# Patient Record
Sex: MALE | Race: WHITE | NOT HISPANIC OR LATINO | URBAN - METROPOLITAN AREA
[De-identification: names, ages, dates, MRNs, and addresses within clinical notes are randomized per-mention and may not be internally consistent; named-entity substitution may affect disease eponyms.]

---

## 2023-01-05 ENCOUNTER — INPATIENT (INPATIENT)
Facility: HOSPITAL | Age: 58
LOS: 0 days | Discharge: LEFT BEFORE DISCHARGE | DRG: 147 | End: 2023-01-06
Attending: STUDENT IN AN ORGANIZED HEALTH CARE EDUCATION/TRAINING PROGRAM | Admitting: STUDENT IN AN ORGANIZED HEALTH CARE EDUCATION/TRAINING PROGRAM
Payer: MEDICAID

## 2023-01-05 VITALS
RESPIRATION RATE: 18 BRPM | HEART RATE: 127 BPM | OXYGEN SATURATION: 97 % | SYSTOLIC BLOOD PRESSURE: 120 MMHG | DIASTOLIC BLOOD PRESSURE: 82 MMHG | TEMPERATURE: 98 F

## 2023-01-05 LAB
ALBUMIN SERPL ELPH-MCNC: 4 G/DL — SIGNIFICANT CHANGE UP (ref 3.3–5.2)
ALP SERPL-CCNC: 105 U/L — SIGNIFICANT CHANGE UP (ref 40–120)
ALT FLD-CCNC: 8 U/L — SIGNIFICANT CHANGE UP
ANION GAP SERPL CALC-SCNC: 9 MMOL/L — SIGNIFICANT CHANGE UP (ref 5–17)
AST SERPL-CCNC: 18 U/L — SIGNIFICANT CHANGE UP
BASOPHILS # BLD AUTO: 0.03 K/UL — SIGNIFICANT CHANGE UP (ref 0–0.2)
BASOPHILS NFR BLD AUTO: 0.4 % — SIGNIFICANT CHANGE UP (ref 0–2)
BILIRUB SERPL-MCNC: 0.4 MG/DL — SIGNIFICANT CHANGE UP (ref 0.4–2)
BUN SERPL-MCNC: 14.9 MG/DL — SIGNIFICANT CHANGE UP (ref 8–20)
CALCIUM SERPL-MCNC: 10.2 MG/DL — SIGNIFICANT CHANGE UP (ref 8.4–10.5)
CHLORIDE SERPL-SCNC: 99 MMOL/L — SIGNIFICANT CHANGE UP (ref 96–108)
CO2 SERPL-SCNC: 31 MMOL/L — HIGH (ref 22–29)
CREAT SERPL-MCNC: 0.61 MG/DL — SIGNIFICANT CHANGE UP (ref 0.5–1.3)
EGFR: 111 ML/MIN/1.73M2 — SIGNIFICANT CHANGE UP
EOSINOPHIL # BLD AUTO: 0.07 K/UL — SIGNIFICANT CHANGE UP (ref 0–0.5)
EOSINOPHIL NFR BLD AUTO: 1 % — SIGNIFICANT CHANGE UP (ref 0–6)
FLUAV AG NPH QL: SIGNIFICANT CHANGE UP
FLUBV AG NPH QL: SIGNIFICANT CHANGE UP
GLUCOSE SERPL-MCNC: 94 MG/DL — SIGNIFICANT CHANGE UP (ref 70–99)
HCT VFR BLD CALC: 40 % — SIGNIFICANT CHANGE UP (ref 39–50)
HGB BLD-MCNC: 14 G/DL — SIGNIFICANT CHANGE UP (ref 13–17)
IMM GRANULOCYTES NFR BLD AUTO: 0.3 % — SIGNIFICANT CHANGE UP (ref 0–0.9)
LYMPHOCYTES # BLD AUTO: 1.11 K/UL — SIGNIFICANT CHANGE UP (ref 1–3.3)
LYMPHOCYTES # BLD AUTO: 15.3 % — SIGNIFICANT CHANGE UP (ref 13–44)
MAGNESIUM SERPL-MCNC: 2.1 MG/DL — SIGNIFICANT CHANGE UP (ref 1.6–2.6)
MCHC RBC-ENTMCNC: 30.2 PG — SIGNIFICANT CHANGE UP (ref 27–34)
MCHC RBC-ENTMCNC: 35 GM/DL — SIGNIFICANT CHANGE UP (ref 32–36)
MCV RBC AUTO: 86.2 FL — SIGNIFICANT CHANGE UP (ref 80–100)
MONOCYTES # BLD AUTO: 0.48 K/UL — SIGNIFICANT CHANGE UP (ref 0–0.9)
MONOCYTES NFR BLD AUTO: 6.6 % — SIGNIFICANT CHANGE UP (ref 2–14)
NEUTROPHILS # BLD AUTO: 5.56 K/UL — SIGNIFICANT CHANGE UP (ref 1.8–7.4)
NEUTROPHILS NFR BLD AUTO: 76.4 % — SIGNIFICANT CHANGE UP (ref 43–77)
PLATELET # BLD AUTO: 273 K/UL — SIGNIFICANT CHANGE UP (ref 150–400)
POTASSIUM SERPL-MCNC: 4.2 MMOL/L — SIGNIFICANT CHANGE UP (ref 3.5–5.3)
POTASSIUM SERPL-SCNC: 4.2 MMOL/L — SIGNIFICANT CHANGE UP (ref 3.5–5.3)
PROT SERPL-MCNC: 6.9 G/DL — SIGNIFICANT CHANGE UP (ref 6.6–8.7)
RBC # BLD: 4.64 M/UL — SIGNIFICANT CHANGE UP (ref 4.2–5.8)
RBC # FLD: 13 % — SIGNIFICANT CHANGE UP (ref 10.3–14.5)
RSV RNA NPH QL NAA+NON-PROBE: SIGNIFICANT CHANGE UP
SARS-COV-2 RNA SPEC QL NAA+PROBE: SIGNIFICANT CHANGE UP
SODIUM SERPL-SCNC: 139 MMOL/L — SIGNIFICANT CHANGE UP (ref 135–145)
WBC # BLD: 7.27 K/UL — SIGNIFICANT CHANGE UP (ref 3.8–10.5)
WBC # FLD AUTO: 7.27 K/UL — SIGNIFICANT CHANGE UP (ref 3.8–10.5)

## 2023-01-05 PROCEDURE — 99053 MED SERV 10PM-8AM 24 HR FAC: CPT

## 2023-01-05 PROCEDURE — 70491 CT SOFT TISSUE NECK W/DYE: CPT | Mod: 26,MA

## 2023-01-05 PROCEDURE — 99285 EMERGENCY DEPT VISIT HI MDM: CPT

## 2023-01-05 RX ORDER — SODIUM CHLORIDE 9 MG/ML
1000 INJECTION, SOLUTION INTRAVENOUS ONCE
Refills: 0 | Status: COMPLETED | OUTPATIENT
Start: 2023-01-05 | End: 2023-01-05

## 2023-01-05 RX ORDER — MORPHINE SULFATE 50 MG/1
4 CAPSULE, EXTENDED RELEASE ORAL ONCE
Refills: 0 | Status: DISCONTINUED | OUTPATIENT
Start: 2023-01-05 | End: 2023-01-05

## 2023-01-05 RX ORDER — ACETAMINOPHEN 500 MG
1000 TABLET ORAL ONCE
Refills: 0 | Status: COMPLETED | OUTPATIENT
Start: 2023-01-05 | End: 2023-01-05

## 2023-01-05 RX ADMIN — MORPHINE SULFATE 4 MILLIGRAM(S): 50 CAPSULE, EXTENDED RELEASE ORAL at 20:30

## 2023-01-05 RX ADMIN — SODIUM CHLORIDE 1000 MILLILITER(S): 9 INJECTION, SOLUTION INTRAVENOUS at 21:05

## 2023-01-05 RX ADMIN — Medication 1000 MILLIGRAM(S): at 21:36

## 2023-01-05 RX ADMIN — MORPHINE SULFATE 4 MILLIGRAM(S): 50 CAPSULE, EXTENDED RELEASE ORAL at 20:05

## 2023-01-05 RX ADMIN — SODIUM CHLORIDE 1000 MILLILITER(S): 9 INJECTION, SOLUTION INTRAVENOUS at 20:05

## 2023-01-05 RX ADMIN — Medication 1000 MILLIGRAM(S): at 21:10

## 2023-01-05 RX ADMIN — Medication 400 MILLIGRAM(S): at 20:52

## 2023-01-05 NOTE — ED ADULT NURSE NOTE - OBJECTIVE STATEMENT
57 year old male presents to ED with c/o painful mass under the tongue.  A&Ox4  CM->NSR  O2 sat 98% on RA  Patient with past medical history of throat cancer s/p chemo/radiation/surgery.  Patient reports he has had a mass growing under the tongue for about 6 weeks, now is unable to swallow solids or liquids, having difficulty speaking and pain has been getting worse.  Seen and evaluated by provider, orders obtained and noted.  IV placed.  Blood specimens and nasal swab obtained and sent to lab.  IVF bolus infusing as ordered.  Patient medicated as ordered.  Patient resting comfortably on stretcher in no acute distress.  Offers no further complaints at this time.

## 2023-01-05 NOTE — ED PROVIDER NOTE - ENMT, MLM
Airway patent, Nasal mucosa clear. Mass protruding from the floor of the mouth under the tongue, voice muffled. Tolerating secretions.

## 2023-01-05 NOTE — ED PROVIDER NOTE - CLINICAL SUMMARY MEDICAL DECISION MAKING FREE TEXT BOX
Likely malignancy to the floor of the mouth, unable to take PO, appears malnourished. Requiring Iv analgesics. ENT consulted for evaluation of treatment options. Will require admission for medical optimization, speech/swallow eval, possible PEG.

## 2023-01-05 NOTE — ED ADULT TRIAGE NOTE - CHIEF COMPLAINT QUOTE
" has a lump growing under tongue and throat which also feels swollen" x 6 weeks. pt reports difficulty speaking/eating /swallowing for 6 weeks. denies sob/drooling/cp. hx esophageal CA > 10 yrs.

## 2023-01-05 NOTE — ED PROVIDER NOTE - OBJECTIVE STATEMENT
57yom w/ CAD, hx of throat CA s/p chemo/radiation/surgery p/w a painful mass under the tongue. Reports he has had a mass growing under the tongue for about 6 weeks, now unable to swallow solids or liquids, having difficulty speaking and pain has been getting worse. Reports pain in the anterior neck as well.

## 2023-01-05 NOTE — ED STATDOCS - PROGRESS NOTE DETAILS
Marnie Romero for ED attending, Dr. Beard: 58 y/o male with PMHx of esophageal cancer with growing mass under tongue, hoarse voice tachycardic, headache,  PO intake, known CAD, Pt will be placed in the main ED for further monitoring for complete evaluation by another provider.

## 2023-01-06 VITALS
OXYGEN SATURATION: 100 % | SYSTOLIC BLOOD PRESSURE: 113 MMHG | DIASTOLIC BLOOD PRESSURE: 67 MMHG | RESPIRATION RATE: 20 BRPM | HEART RATE: 77 BPM

## 2023-01-06 DIAGNOSIS — D49.0 NEOPLASM OF UNSPECIFIED BEHAVIOR OF DIGESTIVE SYSTEM: ICD-10-CM

## 2023-01-06 PROCEDURE — 80053 COMPREHEN METABOLIC PANEL: CPT

## 2023-01-06 PROCEDURE — 87637 SARSCOV2&INF A&B&RSV AMP PRB: CPT

## 2023-01-06 PROCEDURE — 12345: CPT | Mod: NC

## 2023-01-06 PROCEDURE — 36415 COLL VENOUS BLD VENIPUNCTURE: CPT

## 2023-01-06 PROCEDURE — 96375 TX/PRO/DX INJ NEW DRUG ADDON: CPT

## 2023-01-06 PROCEDURE — 70491 CT SOFT TISSUE NECK W/DYE: CPT | Mod: MA

## 2023-01-06 PROCEDURE — 96361 HYDRATE IV INFUSION ADD-ON: CPT

## 2023-01-06 PROCEDURE — 96365 THER/PROPH/DIAG IV INF INIT: CPT

## 2023-01-06 PROCEDURE — 99285 EMERGENCY DEPT VISIT HI MDM: CPT | Mod: 25

## 2023-01-06 PROCEDURE — 85025 COMPLETE CBC W/AUTO DIFF WBC: CPT

## 2023-01-06 PROCEDURE — 83735 ASSAY OF MAGNESIUM: CPT

## 2023-01-06 RX ORDER — MORPHINE SULFATE 50 MG/1
4 CAPSULE, EXTENDED RELEASE ORAL ONCE
Refills: 0 | Status: DISCONTINUED | OUTPATIENT
Start: 2023-01-06 | End: 2023-01-06

## 2023-01-06 RX ADMIN — MORPHINE SULFATE 4 MILLIGRAM(S): 50 CAPSULE, EXTENDED RELEASE ORAL at 03:47

## 2023-01-06 NOTE — CHART NOTE - NSCHARTNOTEFT_GEN_A_CORE
Nocturnist note    Received call from ED for admission to medicine service for further evaluation of large mandibular mass and dysphagia.  Pt was seen and examined and during discussion of care plan which included strict NPO, GI and oncology evaluation, pt stated he wanted to leave AMA as he does not want to be NPO and does not want any feeding tube.  Pt states he wants liquid.  Risk of AMA discussed including delayed evaluation and treatment of likely reoccurrence of throat cancer, aspiration risk with complication of PNA and death, etc.  Pt verbalized understanding of diagnostic findings, risk of AMA and still wants to leave.   AMA form filled out with ED RN Ashlyn Perea at bedside.  Form provided to ED  to scan and place in chart.       Of note, below is information gathered from pt during interview.     57yoM hx former smoker, CAD s/p remote PCI (~2004), throat CA s/p RT/chemo with prior trach/PEG (~2009, performed at [facility]), now reversed, presenting with several week history of painful mass in jaw of month associated with worsening dysphagia and unintentional weight loss. Pt oncologist is Dr. Trey Briggs and radiation oncologist is Dr. Bean and he hasn't followed with either doctor in the past 3-4 years.  Pt states he is not currently on any medication such as ASA or statin.   CT neck with IV contrast performed, shows locally invading soft tissue mass in mandible of malginant etiology, possibly recurrent SCCA given hx.    On exam, pt with visible, large ulcerative mass in bottom of mouth and is hypophonic due to mass.  Pt has pen and paper at bedside to help him communicate.      Pt notified of CT findings and likelihood of recurrent cancer.  Plan for strict NPO, IVF, oncology evaluation, and GI evaluation regarding PEG for nutrition and dysphagia discussed.  As above, pt states he does not want any feeding tube, wants to consume liquids orally.  Pt states he does not believe he is aspirating. Nocturnist note    Received call from ED for admission to medicine service for further evaluation of large mandibular mass and dysphagia.  Pt was seen and examined and during discussion of care plan which included strict NPO, GI and oncology evaluation, pt stated he wanted to leave AMA as he does not want to be NPO and does not want any feeding tube.  Pt states he wants liquid.  Risk of AMA discussed including delayed evaluation and treatment of likely reoccurrence of throat cancer, aspiration risk with complication of PNA and death, etc.  Pt verbalized understanding of diagnostic findings, risk of AMA and still wants to leave.   AMA form filled out with ED RN Ashlyn Perea at bedside who will remove IV from arm.  Form provided to ED  to scan and place in chart.   Pt states he will find his own transport home.      Below is information gathered from pt during interview.     57yoM hx former smoker, CAD s/p remote PCI (~2004), throat CA s/p RT/chemo with prior trach/PEG (~2009, performed at [facility]), now reversed, presenting with several week history of painful mass in jaw of month associated with worsening dysphagia and unintentional weight loss. Pt oncologist is Dr. Trey Briggs and radiation oncologist is Dr. Bean and he hasn't followed with either doctor in the past 3-4 years.  Pt states he is not currently on any medication such as ASA or statin.   CT neck with IV contrast performed, shows locally invading soft tissue mass in mandible of malginant etiology, possibly recurrent SCCA given hx.    On exam, pt with cachetic and has visible, large ulcerative mass in bottom of mouth. His speech is muffled and hypophonic due to jaw mass.  Pt has pen and paper at bedside to help him communicate.      Pt notified of CT findings and likelihood of recurrent head and neck cancer.  Plan for strict NPO, IVF, oncology, palliative and GI evaluation (re: likely need for PEG) all discussed with pat.  As above, pt states he does not want any feeding tube, wants to consume liquids orally.  Pt states he does not believe he is aspirating. Nocturnist note    Received call from ED for admission to medicine service for further evaluation of large mandibular mass and dysphagia.  Pt was seen and examined and during discussion of care plan which included strict NPO, GI and oncology evaluation, pt stated he wanted to leave AMA as he does not want to be NPO and does not want any feeding tube.  Pt states he wants liquid.  Risk of AMA discussed including delayed evaluation and treatment of likely reoccurrence of throat cancer, aspiration risk with complication of PNA and death, etc.  Pt verbalized understanding of diagnostic findings, risk of AMA and still wants to leave.   AMA form filled out with ED RN Ashlyn Perea at bedside who will remove IV from arm.  Form provided to ED  to scan and place in chart.   Pt states he will find his own transport home.      Below is information gathered from pt during interview.     57yoM hx former smoker, CAD s/p remote PCI (~2004), throat CA s/p RT/chemo with prior trach/PEG (~2009, performed at [facility]), now reversed, presenting with several week history of painful mass in jaw of month associated with worsening dysphagia and unintentional weight loss. Pt oncologist is Dr. Trey Briggs and radiation oncologist is Dr. Bean and he hasn't followed with either doctor in the past 3-4 years.  Pt states he is not currently on any medication (i.e. no maintenance chemo or cardiac meds such as ASA, statin).   CT neck with IV contrast performed, shows locally invading soft tissue mass in mandible of malginant etiology, possibly recurrent SCCA given hx.    On exam, pt with cachetic and has visible, large ulcerative mass in bottom of mouth. His speech is muffled and hypophonic due to jaw mass.  Pt has pen and paper at bedside to help him communicate.      Pt notified of CT findings and likelihood of recurrent head and neck cancer.  Plan for strict NPO, IVF, oncology, palliative and GI evaluation (re: likely need for PEG) all discussed with pat.  As above, pt states he does not want any feeding tube, wants to consume liquids orally.  Pt states he does not believe he is aspirating.

## 2023-01-12 ENCOUNTER — APPOINTMENT (OUTPATIENT)
Dept: OTOLARYNGOLOGY | Facility: CLINIC | Age: 58
End: 2023-01-12

## 2023-04-10 NOTE — ED ADULT NURSE NOTE - CHPI ED NUR SYMPTOMS POS
-- DO NOT REPLY / DO NOT REPLY ALL --  -- Message is from Engagement Center Operations (ECO) --    ONLY TO BE USED WITHIN A REFILL MEDICATION ENCOUNTER    Med Refill  Is the patient currently having any symptoms?: No/Non-Emergent symptoms    Name of medication requested: See pended med    Has patient contacted the pharmacy? Yes    Is this the first request for the medication in the last 48 hours?: Yes    Patient is requesting a medication refill - medication is on active medication list    Patient is currently OUT of the requested medication - sent as HIGH priority    Patient states his insurance company is requesting a letter of medical nesscity along with his name and membership ID #, membership ID# is [id number]  patient states this is needed for refill on name brand Viagra 100 mg, please fax to [phone] ATTN: [facility] Benefits    Patient states he is currently out of town and would like the prescription sent to [facility] in [place]    Patient states he has not been able to get prescription refilled since December 2022, please call back to advise if needed      Full name of the provider who ordered the medication: Susanne Rico MD    Clinic site name / Account # for provider: [facility]    Preferred Pharmacy: Pharmacy  [facility]Pharmacy [id number] [address] [facility]    Patient confirmed the above pharmacy as correct?  Yes       Patient states he is currently out of town and would like the prescription sent to [facility] in [place]    Alternative phone number: no    Can a detailed message be left?: Yes    Patient is completely out of medication: Verify if patient is currently experiencing symptoms. If patient is symptomatic, proceed with front end triage instead of medication refill. If patient is not symptomatic but is completely out of medication, bruno as High priority when routing. Inform patient: “Please call back with any questions or concerns and if your condition becomes life threatening, you should seek  immediate medical assistance by calling 911 or going to the Emergency Department for evaluation.”    Inform all patients: \"If the clinical team needs to contact you regarding this refill, please be aware the return phone call may come from an unidentified or out of state phone number and your refill request will be addressed as soon as the clinical team reviews your message.\"   PAIN/THROAT PAIN